# Patient Record
Sex: FEMALE | Race: ASIAN | NOT HISPANIC OR LATINO | Employment: FULL TIME | URBAN - METROPOLITAN AREA
[De-identification: names, ages, dates, MRNs, and addresses within clinical notes are randomized per-mention and may not be internally consistent; named-entity substitution may affect disease eponyms.]

---

## 2018-04-23 ENCOUNTER — OFFICE VISIT (OUTPATIENT)
Dept: LAB | Facility: SCHOOL | Age: 23
End: 2018-04-23

## 2018-04-23 VITALS — TEMPERATURE: 98.5 F

## 2018-04-23 DIAGNOSIS — Z23 ENCOUNTER FOR IMMUNIZATION: Primary | ICD-10-CM

## 2018-04-23 PROCEDURE — 90715 TDAP VACCINE 7 YRS/> IM: CPT | Performed by: PHYSICIAN ASSISTANT

## 2018-04-23 PROCEDURE — 90472 IMMUNIZATION ADMIN EACH ADD: CPT | Performed by: PHYSICIAN ASSISTANT

## 2018-04-23 PROCEDURE — 90471 IMMUNIZATION ADMIN: CPT | Performed by: PHYSICIAN ASSISTANT

## 2018-04-23 PROCEDURE — 99207 ZZC NO CHARGE NURSE ONLY: CPT | Performed by: PHYSICIAN ASSISTANT

## 2018-04-23 PROCEDURE — 90707 MMR VACCINE SC: CPT | Performed by: PHYSICIAN ASSISTANT

## 2018-04-23 NOTE — MR AVS SNAPSHOT
"              After Visit Summary   2018    Alysha Guevara    MRN: [id number]           Patient Information     Date Of Birth          1995        Visit Information        Provider Department      2018 2:00 PM Chary Leonard PA-C [facility]        Today's Diagnoses     Encounter for immunization    -  1       Follow-ups after your visit        Who to contact     You can reach your care team any time of the day by calling [phone].  Notification of test results:  If you have an abnormal lab result, we will notify you by phone as soon as possible.         Additional Information About Your Visit        MyChart Information     [facility] lets you send messages to your doctor, view your test results, renew your prescriptions, schedule appointments and more. To sign up, go to [email or web address] . Click on \"Log in\" on the left side of the screen, which will take you to the Welcome page. Then click on \"Sign up Now\" on the right side of the page.     You will be asked to enter the access code listed below, as well as some personal information. Please follow the directions to create your username and password.     Your access code is: [email or web address]  Expires: 2018  2:21 PM     Your access code will  in 90 days. If you need help or a new code, please call your [facility] clinic or [phone].        Care EveryWhere ID     This is your Care EveryWhere ID. This could be used by other organizations to access your [facility] medical records  [id number]        Your Vitals Were     Temperature                   98.5  F (36.9  C) (Oral)            Blood Pressure from Last 3 Encounters:   No data found for BP    Weight from Last 3 Encounters:   No data found for Wt              We Performed the Following     MMR VIRUS IMMUNIZATION, SUBCUT     TDAP (ADACEL AGES 11-64)     VACCINE ADMINISTRATION, EACH ADDITIONAL     VACCINE ADMINISTRATION, INITIAL        Primary Care " Provider    None Specified       No primary provider on file.        Equal Access to Services     NICHO CORTES : Hadii andrade Nunez, luzmaria araya, sandy ellis. So [facility] [phone].    ATENCIÓN: Si habla español, tiene a khanna disposición servicios gratuitos de asistencia lingüística. Llame al [phone].    We comply with applicable federal civil rights laws and [place] laws. We do not discriminate on the basis of race, color, national origin, age, disability, sex, sexual orientation, or gender identity.            Thank you!     Thank you for choosing [facility]  for your care. Our goal is always to provide you with excellent care. Hearing back from our patients is one way we can continue to improve our services. Please take a few minutes to complete the written survey that you may receive in the mail after your visit with us. Thank you!             Your Updated Medication List - Protect others around you: Learn how to safely use, store and throw away your medicines at www.disposemymeds.org.      Notice  As of 4/23/2018  2:21 PM    You have not been prescribed any medications.

## 2018-04-23 NOTE — NURSING NOTE
Chief Complaint   Patient presents with     Imm/Inj     MMR and Tdap       Initial Temp 98.5  F (36.9  C) (Oral) There is no height or weight on file to calculate BMI.  Medication Reconciliation: complete     Ko Porter CMA    Screening Questionnaire for Adult Immunization    Are you sick today?   No   Do you have allergies to medications, food, a vaccine component or latex?   No   Have you ever had a serious reaction after receiving a vaccination?   No   Do you have a long-term health problem with heart disease, lung disease, asthma, kidney disease, metabolic disease (e.g. diabetes), anemia, or other blood disorder?   No   Do you have cancer, leukemia, HIV/AIDS, or any other immune system problem?   No   In the past 3 months, have you taken medications that affect  your immune system, such as prednisone, other steroids, or anticancer drugs; drugs for the treatment of rheumatoid arthritis, Crohn s disease, or psoriasis; or have you had radiation treatments?   No   Have you had a seizure, or a brain or other nervous system problem?   No   During the past year, have you received a transfusion of blood or blood     products, or been given immune (gamma) globulin or antiviral drug?   No   For women: Are you pregnant or is there a chance you could become        pregnant during the next month?   No   Have you received any vaccinations in the past 4 weeks?   No     Immunization questionnaire answers were all negative.        Per orders of Chary Leonard PA-C, injection of MMR and Tdap given by Ko Porter. Patient instructed to remain in clinic for 15 minutes afterwards, and to report any adverse reaction to me immediately.       Screening performed by Ko Porter on 4/23/2018 at 2:16 PM.

## 2023-04-11 PROBLEM — Z00.00 ENCOUNTER FOR PREVENTIVE HEALTH EXAMINATION: Status: ACTIVE | Noted: 2023-04-11

## 2023-04-13 ENCOUNTER — OFFICE VISIT (OUTPATIENT)
Dept: OBGYN | Facility: CLINIC | Age: 28
End: 2023-04-13
Attending: REGISTERED NURSE
Payer: COMMERCIAL

## 2023-04-13 VITALS
HEIGHT: 63 IN | DIASTOLIC BLOOD PRESSURE: 64 MMHG | WEIGHT: 110.9 LBS | BODY MASS INDEX: 19.65 KG/M2 | SYSTOLIC BLOOD PRESSURE: 96 MMHG | HEART RATE: 71 BPM

## 2023-04-13 DIAGNOSIS — Z00.00 VISIT FOR PREVENTIVE HEALTH EXAMINATION: Primary | ICD-10-CM

## 2023-04-13 DIAGNOSIS — Z00.00 ENCOUNTER FOR PREVENTIVE HEALTH EXAMINATION: ICD-10-CM

## 2023-04-13 PROCEDURE — G0145 SCR C/V CYTO,THINLAYER,RESCR: HCPCS | Performed by: REGISTERED NURSE

## 2023-04-13 PROCEDURE — 87624 HPV HI-RISK TYP POOLED RSLT: CPT | Performed by: REGISTERED NURSE

## 2023-04-13 PROCEDURE — G0124 SCREEN C/V THIN LAYER BY MD: HCPCS | Performed by: PATHOLOGY

## 2023-04-13 PROCEDURE — G0463 HOSPITAL OUTPT CLINIC VISIT: HCPCS | Mod: 25 | Performed by: REGISTERED NURSE

## 2023-04-13 PROCEDURE — 99385 PREV VISIT NEW AGE 18-39: CPT | Performed by: REGISTERED NURSE

## 2023-04-13 ASSESSMENT — ANXIETY QUESTIONNAIRES
3. WORRYING TOO MUCH ABOUT DIFFERENT THINGS: NOT AT ALL
6. BECOMING EASILY ANNOYED OR IRRITABLE: NOT AT ALL
7. FEELING AFRAID AS IF SOMETHING AWFUL MIGHT HAPPEN: NOT AT ALL
5. BEING SO RESTLESS THAT IT IS HARD TO SIT STILL: NOT AT ALL
GAD7 TOTAL SCORE: 0
GAD7 TOTAL SCORE: 0
2. NOT BEING ABLE TO STOP OR CONTROL WORRYING: NOT AT ALL
1. FEELING NERVOUS, ANXIOUS, OR ON EDGE: NOT AT ALL

## 2023-04-13 ASSESSMENT — PATIENT HEALTH QUESTIONNAIRE - PHQ9: 5. POOR APPETITE OR OVEREATING: NOT AT ALL

## 2023-04-13 NOTE — PATIENT INSTRUCTIONS
PREVENTIVE HEALTH RECOMMENDATIONS:   Most women need a yearly breast and pelvic exam.    A PAP screen, a test done DURING a pelvic exam, is NO longer recommended yearly.    March 2013, screening guidelines recommended by ACOG for PAP screen are:    1) First pap at age 21.    2) Pap every 3 years until age 30.    3) After age 30, pap every 3 years or Pap with HR HPV screen every 5 years until age 65.  4) Women do NOT need a vaginal Pap screen after a hysterectomy (surgical removal of the uterus) when they have not had cancer.    Exceptions:  1) Yearly pap if HIV+ or immunosuppressed secondary to organ transplant  2) BETSEY II-III continue routine screening for 20 years.    I encourage you continue looking for opportunities to choose a healthy lifestyle:       * Choose to eat a heart healthy diet. Check out the FOOD PLATE guidelines at: http://www.choosemyplate.gov/ for helpful hints on weight and cholesterol management.  Balance your caloric intake with exercise to maintain a BMI in the 22 to 26 range. For bone health: Eat calcium-rich foods like yogurt, broccoli or take chewable calcium pills (500 to 600 mg) twice a day with food.       * Exercise for at least an average of 30 minutes a day, 5 days of the week. This will help you control your weight, release stress, and help prevent disease.      * Take a Vitamin D3 supplement daily fall through spring and during summer unless you [id number]' full body sun exposure to skin without sunscreen.      * DO wear sunscreen to prevent skin cancer after the first 15-30 minutes.      * Identify stressors in your life, find ways to release the stress, and, make time for yourself. PLEASE ask for help if mood changes last longer than two weeks.     * Limit alcohol to one drink per day.  No smoking.  Avoid second hand smoke. If you smoke, ask for help to stop.       *  If you are in a sexual relationship, talk with your partner about possible infection risks and take action to  protect yourself from exposure to a sexual infection.    Please request an infection screen for STIs (sexually transmitted infections) if you are less than age 26 OR believe that you may be at risk.     Get a flu shot each year. Get a tetanus shot every 10 years. EVERYONE needs a pertussis (Whooping cough) booster.    See your dentist twice a year for an exam and preventive care cleaning.     Consider the following screen tests:    1) cholesterol test every 5 years.     2) yearly mammogram after age 40 unless you have identified risks.    3) colonoscopy every 10 years after age 50 unless you have identified risks.    4) diabetes blood test screening if you are at risk for diabetes.      Additional information that you may also find helpful:  The Internet now gives us access to LOTS of information -- some of it helpful, research documented and also plenty of harmful, anecdotal information that may not pertain to your situtaion. Consider visiting the following websites for accurate health information:    www.vitamindcouncil.org/ : Info and ongoing research re Vitamin D    www.Geotender.org : Up to date and easily searchable information on multiple topics.    www.medlineplus.gov : medication info, interactive tutorials, watch real surgeries online    www.cdc.gov : public health info, travel advisories, epidemics (H1N1)    www.artur/std.org: current research re diagnosis, treatment and prevention of sexually contacted infections.    www.health.[facility].[place].us : [place] dept of heatl, public health issues in [place], N1N1    www.familydoctor.org : good info from the Academy of Family Physicians      Thank you for trusting us with your care!     If you need to contact us for questions about:  Symptoms, Scheduling & Medical Questions; Non-urgent (2-3 day response) Kalpesh message, Urgent (needing response today) [phone] (if after 3:30pm next day response)   Prescriptions: Please call your Pharmacy   Billing: Tana [phone] or  [facility]:[phone]

## 2023-04-13 NOTE — PROGRESS NOTES
Progress Note    SUBJECTIVE:  Alysha Guevara is an 27 year old, No obstetric history on file., who requests an Annual Preventive Exam.     GYN history:   LMP: unsure.   Regular menses? No. Gets periods every 1-2 months. Length of menses: 7 days  Sexually active? No.   STI screening? No concerns. Is not sexually active.   Contraception? none. Interested in discussing options today.     Cervical cancer screening: Has never had one.     Exercises 6 days/week    Concerns today include:   - Wondering about the difference between Nexplanon and IUDs     Menstrual History:       View : No data to display.                Last  No results found for: PAP  History of abnormal Pap smear: NO - age 21-29 PAP every 3 years recommended    Last No results found for: HPV16  Last No results found for: HPV18  Last No results found for: HRHPV    Mammogram current: not applicable  Last Mammogram:   No results found.     Last Colonoscopy:  No results found for this or any previous visit.      HISTORY:  No current outpatient medications on file prior to visit.  No current facility-administered medications on file prior to visit.    No Known Allergies  Immunization History   Administered Date(s) Administered     MMR 04/23/2018     TDAP Vaccine (Adacel) 04/23/2018       OB History   No obstetric history on file.     History reviewed. No pertinent past medical history.  Past Surgical History:   Procedure Laterality Date     WRIST FRACTURE SURGERY Right     s/p removal of screws     Family History   Problem Relation Age of Onset     No Known Problems Mother      No Known Problems Father      No Known Problems Sister      Lung Cancer Maternal Grandmother 65     Diabetes Maternal Grandmother      Diabetes Maternal Grandfather      Social History     Socioeconomic History     Marital status: Single       ROS  Negative other than noted in HPI       View : No data to display.                  4/13/2023    10:03 AM   STACY-7 SCORE   Total Score 0  "        EXAM:  Blood pressure 96/64, pulse 71, height 1.59 m (5' 2.6\"), weight 50.3 kg (110 lb 14.4 oz). Body mass index is 19.9 kg/m .  General - pleasant female in no acute distress.  Skin - no suspicious lesions or rashes  EENT-  PERRLA, euthyroid with out palpable nodules  Neck - supple without lymphadenopathy.  Lungs - clear to auscultation bilaterally.  Heart - regular rate and rhythm without murmur.  Abdomen - soft, nontender, nondistended, no masses or organomegaly noted.  Musculoskeletal - no gross deformities.  Neurological - normal strength, sensation, and mental status.    Breast Exam:  Breast: Without visible skin changes. No dimpling or lesions seen.   Breasts supple, non-tender with palpation, no dominant mass, nodularity, or nipple discharge noted bilaterally. Axillary nodes negative.      Pelvic Exam:  EG/BUS: Normal genital architecture without lesions, erythema or abnormal secretions Bartholin's, Urethra, [place]'s normal   Urethral meatus: normal   Urethra: no masses, tenderness, or scarring   Vagina: moist, pink, rugae with creamy, white and odorless  secretions  Cervix: pink, moist, closed, without lesion or CMT. Pap collected.   Uterus: bimanual exam deferred. No concerns.   Adnexa: Bimanual exam deferred. No concerns.   Rectum:anus normal       ASSESSMENT:  Encounter Diagnosis   Name Primary?     Visit for preventive health examination Yes        PLAN:   Orders Placed This Encounter   Procedures     Obtaining, preparing and conveyance of cervical or vaginal smear to laboratory.     No orders of the defined types were placed in this encounter.    - Discussed utility, mechanism of action, side effects of both Nexplanon and IUD (hormonal and non hormonal). Also provided with  handouts. Patient will schedule procedure once she's made here decision.     Additional teaching done at this visit regarding calcium (1200 mg per day), birth control and weight/diet.    Return to clinic in one " year.  Follow-up as needed.    JULISSA SteeleM

## 2023-04-13 NOTE — LETTER
4/13/2023       RE: Alysha Guevara  [address]  [place] [address]     Dear Colleague,    Thank you for referring your patient, Alysha Guevara, to the [facility] [place] [facility]. Please see a copy of my visit note below.      Progress Note    SUBJECTIVE:  Alysha Guevara is an 27 year old, No obstetric history on file., who requests an Annual Preventive Exam.     GYN history:   LMP: unsure.   Regular menses? No. Gets periods every 1-2 months. Length of menses: 7 days  Sexually active? No.   STI screening? No concerns. Is not sexually active.   Contraception? none. Interested in discussing options today.     Cervical cancer screening: Has never had one.     Exercises 6 days/week    Concerns today include:   - Wondering about the difference between Nexplanon and IUDs     Menstrual History:       View : No data to display.                Last  No results found for: PAP  History of abnormal Pap smear: NO - age 21-29 PAP every 3 years recommended    Last No results found for: HPV16  Last No results found for: HPV18  Last No results found for: HRHPV    Mammogram current: not applicable  Last Mammogram:   No results found.     Last Colonoscopy:  No results found for this or any previous visit.      HISTORY:  No current outpatient medications on file prior to visit.  No current facility-administered medications on file prior to visit.    No Known Allergies  Immunization History   Administered Date(s) Administered    MMR 04/23/2018    TDAP Vaccine (Adacel) 04/23/2018       OB History   No obstetric history on file.     History reviewed. No pertinent past medical history.  Past Surgical History:   Procedure Laterality Date    WRIST FRACTURE SURGERY Right     s/p removal of screws     Family History   Problem Relation Age of Onset    No Known Problems Mother     No Known Problems Father     No Known Problems Sister     Lung Cancer Maternal Grandmother 65  "   Diabetes Maternal Grandmother     Diabetes Maternal Grandfather      Social History     Socioeconomic History    Marital status: Single       ROS  Negative other than noted in HPI       View : No data to display.                  4/13/2023    10:03 AM   STACY-7 SCORE   Total Score 0         EXAM:  Blood pressure 96/64, pulse 71, height 1.59 m (5' 2.6\"), weight 50.3 kg (110 lb 14.4 oz). Body mass index is 19.9 kg/m .  General - pleasant female in no acute distress.  Skin - no suspicious lesions or rashes  EENT-  PERRLA, euthyroid with out palpable nodules  Neck - supple without lymphadenopathy.  Lungs - clear to auscultation bilaterally.  Heart - regular rate and rhythm without murmur.  Abdomen - soft, nontender, nondistended, no masses or organomegaly noted.  Musculoskeletal - no gross deformities.  Neurological - normal strength, sensation, and mental status.    Breast Exam:  Breast: Without visible skin changes. No dimpling or lesions seen.   Breasts supple, non-tender with palpation, no dominant mass, nodularity, or nipple discharge noted bilaterally. Axillary nodes negative.      Pelvic Exam:  EG/BUS: Normal genital architecture without lesions, erythema or abnormal secretions Bartholin's, Urethra, [place]'s normal   Urethral meatus: normal   Urethra: no masses, tenderness, or scarring   Vagina: moist, pink, rugae with creamy, white and odorless  secretions  Cervix: pink, moist, closed, without lesion or CMT. Pap collected.   Uterus: bimanual exam deferred. No concerns.   Adnexa: Bimanual exam deferred. No concerns.   Rectum:anus normal       ASSESSMENT:  Encounter Diagnosis   Name Primary?    Visit for preventive health examination Yes        PLAN:   Orders Placed This Encounter   Procedures    Obtaining, preparing and conveyance of cervical or vaginal smear to laboratory.     No orders of the defined types were placed in this encounter.    - Discussed utility, mechanism of action, side effects of both Nexplanon " and IUD (hormonal and non hormonal). Also provided with  handouts. Patient will schedule procedure once she's made here decision.     Additional teaching done at this visit regarding calcium (1200 mg per day), birth control and weight/diet.    Return to clinic in one year.  Follow-up as needed.    JULISSA Steele CNM

## 2023-04-17 LAB
BKR LAB AP GYN ADEQUACY: ABNORMAL
BKR LAB AP GYN INTERPRETATION: ABNORMAL
BKR LAB AP HPV REFLEX: ABNORMAL
BKR LAB AP PREVIOUS ABNORMAL: ABNORMAL
PATH REPORT.COMMENTS IMP SPEC: ABNORMAL
PATH REPORT.COMMENTS IMP SPEC: ABNORMAL
PATH REPORT.RELEVANT HX SPEC: ABNORMAL

## 2023-04-19 LAB
HUMAN PAPILLOMA VIRUS 16 DNA: NEGATIVE
HUMAN PAPILLOMA VIRUS 18 DNA: NEGATIVE
HUMAN PAPILLOMA VIRUS FINAL DIAGNOSIS: ABNORMAL
HUMAN PAPILLOMA VIRUS OTHER HR: POSITIVE

## 2023-04-20 ENCOUNTER — PATIENT OUTREACH (OUTPATIENT)
Dept: OBGYN | Facility: CLINIC | Age: 28
End: 2023-04-20
Payer: COMMERCIAL

## 2023-04-20 PROBLEM — R87.810 ASCUS WITH POSITIVE HIGH RISK HPV CERVICAL: Status: ACTIVE | Noted: 2023-04-20

## 2023-04-20 PROBLEM — R87.610 ASCUS WITH POSITIVE HIGH RISK HPV CERVICAL: Status: ACTIVE | Noted: 2023-04-20

## 2023-04-20 NOTE — LETTER
September 7, 2023      Alysha Guevara  [address] [address]        Dear ,    At [facility], your health and wellness are our primary concern. That is why we are following up on your most recent abnormal Pap smear and positive high-risk Human Papillomavirus (HPV) test.    Please call [phone] to schedule an appointment for your recommended follow-up Colposcopy (this cannot be scheduled through [facility]) at your earliest convenience.     If you have completed the appointment outside of the [facility] system, please have the records forwarded to our office. We will update your chart for your provider to review before your next annual wellness visit.     Thank you for choosing [facility]!      Sincerely,    Your [facility] Care Team

## 2023-04-20 NOTE — LETTER
June 14, 2023      Alysha Guevara  [address] [address]        Dear ,    At [facility], your health and wellness are our primary concern. That is why we are following up on your most recent abnormal Pap smear and positive high-risk Human Papillomavirus (HPV) test.    Please call [phone] to schedule an appointment for your recommended follow-up Colposcopy (this cannot be scheduled through [facility]) at your earliest convenience.     If you have completed the appointment outside of the [facility] system, please have the records forwarded to our office. We will update your chart for your provider to review before your next annual wellness visit.     Thank you for choosing [facility]!      Sincerely,    Your [facility] Care Team

## 2023-10-30 NOTE — TELEPHONE ENCOUNTER
FYI to provider - Patient is lost to pap tracking follow-up. Attempts to contact pt have been made per reminder process and there has been no reply and/or no appt scheduled. Contact hx listed below.     4/13/23 ASCUS pap, + HR HPV (not 16 or 18). Plan colp due by 7/13/23 4/20/23 pt notified by phone.   6/14/23 Reminder letter (2 month)  7/12/23 [place]ram not done. Tracking updated for 6 mo colp/pap due 10/13/23.  9/7/23 Reminder letter   10/4/23 Reminder call-LM  10/30/23 Lost to follow up